# Patient Record
Sex: MALE | Race: WHITE | Employment: FULL TIME | URBAN - METROPOLITAN AREA
[De-identification: names, ages, dates, MRNs, and addresses within clinical notes are randomized per-mention and may not be internally consistent; named-entity substitution may affect disease eponyms.]

---

## 2017-01-12 ENCOUNTER — TELEPHONE (OUTPATIENT)
Dept: FAMILY MEDICINE CLINIC | Age: 32
End: 2017-01-12

## 2017-01-12 DIAGNOSIS — F41.9 ANXIETY: Primary | ICD-10-CM

## 2017-01-13 RX ORDER — LORAZEPAM 0.5 MG/1
0.5 TABLET ORAL EVERY 8 HOURS PRN
Qty: 15 TABLET | Refills: 0 | Status: SHIPPED | OUTPATIENT
Start: 2017-01-13 | End: 2018-02-23 | Stop reason: SDUPTHER

## 2017-08-23 ENCOUNTER — TELEPHONE (OUTPATIENT)
Dept: FAMILY MEDICINE CLINIC | Age: 32
End: 2017-08-23

## 2018-02-23 ENCOUNTER — OFFICE VISIT (OUTPATIENT)
Dept: FAMILY MEDICINE CLINIC | Age: 33
End: 2018-02-23

## 2018-02-23 VITALS
SYSTOLIC BLOOD PRESSURE: 132 MMHG | HEART RATE: 64 BPM | HEIGHT: 70 IN | WEIGHT: 180 LBS | DIASTOLIC BLOOD PRESSURE: 74 MMHG | RESPIRATION RATE: 12 BRPM | BODY MASS INDEX: 25.77 KG/M2

## 2018-02-23 DIAGNOSIS — Z00.00 WELL ADULT EXAM: Primary | ICD-10-CM

## 2018-02-23 DIAGNOSIS — F41.9 ANXIETY: ICD-10-CM

## 2018-02-23 PROCEDURE — 99395 PREV VISIT EST AGE 18-39: CPT | Performed by: FAMILY MEDICINE

## 2018-02-23 RX ORDER — LORAZEPAM 0.5 MG/1
0.5 TABLET ORAL EVERY 8 HOURS PRN
Qty: 15 TABLET | Refills: 1 | Status: SHIPPED | OUTPATIENT
Start: 2018-02-23 | End: 2018-03-25

## 2018-02-23 ASSESSMENT — ENCOUNTER SYMPTOMS
BACK PAIN: 0
COUGH: 0
BLOOD IN STOOL: 0
EYE PAIN: 0
CONSTIPATION: 0
NAUSEA: 0
TROUBLE SWALLOWING: 0
ABDOMINAL PAIN: 0
CHEST TIGHTNESS: 0
SHORTNESS OF BREATH: 0
SORE THROAT: 0

## 2018-02-23 ASSESSMENT — PATIENT HEALTH QUESTIONNAIRE - PHQ9
2. FEELING DOWN, DEPRESSED OR HOPELESS: 0
1. LITTLE INTEREST OR PLEASURE IN DOING THINGS: 0
SUM OF ALL RESPONSES TO PHQ QUESTIONS 1-9: 0
SUM OF ALL RESPONSES TO PHQ9 QUESTIONS 1 & 2: 0

## 2018-02-24 LAB
CHOLESTEROL, TOTAL: ABNORMAL MG/DL
CHOLESTEROL/HDL RATIO: ABNORMAL
HDLC SERPL-MCNC: ABNORMAL MG/DL (ref 35–70)
LDL CHOLESTEROL CALCULATED: 162 MG/DL (ref 0–160)
TRIGL SERPL-MCNC: ABNORMAL MG/DL
VLDLC SERPL CALC-MCNC: ABNORMAL MG/DL

## 2018-02-26 DIAGNOSIS — Z00.00 WELL ADULT EXAM: ICD-10-CM

## 2018-02-27 ENCOUNTER — TELEPHONE (OUTPATIENT)
Dept: FAMILY MEDICINE CLINIC | Age: 33
End: 2018-02-27

## 2018-02-27 DIAGNOSIS — E78.00 PURE HYPERCHOLESTEROLEMIA: Primary | ICD-10-CM

## 2018-02-27 NOTE — TELEPHONE ENCOUNTER
----- Message from Jaqueline Scott MD sent at 2/27/2018  5:59 AM EST -----  Chol up at 226 and ldl high at 162 and want less than 130 . Diet and  Exercise and slip  For  Low  Chol foods .  Level in 6 mths  On printer  For  August

## 2019-11-01 ENCOUNTER — OFFICE VISIT (OUTPATIENT)
Dept: FAMILY MEDICINE CLINIC | Age: 34
End: 2019-11-01

## 2019-11-01 VITALS
DIASTOLIC BLOOD PRESSURE: 66 MMHG | RESPIRATION RATE: 14 BRPM | BODY MASS INDEX: 25.62 KG/M2 | SYSTOLIC BLOOD PRESSURE: 120 MMHG | HEIGHT: 70 IN | WEIGHT: 179 LBS | HEART RATE: 64 BPM

## 2019-11-01 DIAGNOSIS — Z00.00 WELL ADULT EXAM: Primary | ICD-10-CM

## 2019-11-01 DIAGNOSIS — E78.5 HYPERLIPIDEMIA LDL GOAL <100: ICD-10-CM

## 2019-11-01 PROCEDURE — 99395 PREV VISIT EST AGE 18-39: CPT | Performed by: FAMILY MEDICINE

## 2019-11-01 ASSESSMENT — PATIENT HEALTH QUESTIONNAIRE - PHQ9
SUM OF ALL RESPONSES TO PHQ QUESTIONS 1-9: 0
2. FEELING DOWN, DEPRESSED OR HOPELESS: 0
1. LITTLE INTEREST OR PLEASURE IN DOING THINGS: 0
SUM OF ALL RESPONSES TO PHQ QUESTIONS 1-9: 0
SUM OF ALL RESPONSES TO PHQ9 QUESTIONS 1 & 2: 0

## 2019-11-01 ASSESSMENT — ENCOUNTER SYMPTOMS
NAUSEA: 0
ABDOMINAL PAIN: 0
COUGH: 0
BACK PAIN: 0
CONSTIPATION: 0
EYE PAIN: 0
TROUBLE SWALLOWING: 0
SHORTNESS OF BREATH: 0
BLOOD IN STOOL: 0
SORE THROAT: 0
CHEST TIGHTNESS: 0

## 2019-11-08 LAB
CHOLESTEROL, TOTAL: ABNORMAL
CHOLESTEROL/HDL RATIO: ABNORMAL
HDLC SERPL-MCNC: ABNORMAL MG/DL
LDL CHOLESTEROL CALCULATED: 191 MG/DL (ref 0–160)
TRIGL SERPL-MCNC: ABNORMAL MG/DL
VLDLC SERPL CALC-MCNC: ABNORMAL MG/DL

## 2019-11-11 DIAGNOSIS — E78.5 HYPERLIPIDEMIA LDL GOAL <100: ICD-10-CM

## 2019-11-13 ENCOUNTER — TELEPHONE (OUTPATIENT)
Dept: FAMILY MEDICINE CLINIC | Age: 34
End: 2019-11-13

## 2019-11-13 DIAGNOSIS — E78.5 HYPERLIPIDEMIA LDL GOAL <100: Primary | ICD-10-CM

## 2019-11-13 PROBLEM — E78.01 FAMILIAL HYPERCHOLESTEROLEMIA: Status: ACTIVE | Noted: 2019-11-13

## 2020-10-27 NOTE — PROGRESS NOTES
.  CARE VISIT    Madison Cespedes  YOB: 1985    Date of Service:  2/23/2018    Chief Complaint:   Madison Cespedes is a 28 y.o. male who presents for Comprehensive Annual Evaluation    There are no active problems to display for this patient. Left knee  Fair   And  left shoulder     Job  With   stress     Cystic  Fibrosis   Mild and  Vas deferens  Absent   Preventive Care:  Last eye exam:   stable   Exercise: no regular exercise  Fracture within the past 6 months: no       Living will:  no,   Need will . Review of Systems   Constitutional: Negative for fatigue and fever. HENT: Negative for congestion, ear pain, postnasal drip, sore throat and trouble swallowing. Eyes: Negative for pain. Respiratory: Negative for cough, chest tightness and shortness of breath. Cardiovascular: Negative for chest pain, palpitations and leg swelling. Vague  Pain  At  Rest    Gastrointestinal: Negative for abdominal pain, blood in stool, constipation and nausea. Genitourinary: Negative for difficulty urinating, frequency and urgency. Musculoskeletal: Negative for arthralgias, back pain, joint swelling and neck stiffness. Skin: Negative for rash. Neurological: Negative for dizziness, weakness and headaches. Hematological: Negative for adenopathy. Does not bruise/bleed easily. Psychiatric/Behavioral: Negative for behavioral problems, dysphoric mood and sleep disturbance.        No Known Allergies  Prior to Visit Medications    Not on File       Past Medical History:   Diagnosis Date    Cystic fibrosis ([address]     mild  no vas deferens      Past Surgical History:   Procedure Laterality Date    KNEE SURGERY  2010, 2011    ACL repair, and scope the following year  left     SHOULDER SURGERY Left 10-15-15    Labral Tear- OIO by Dr Santos Patient      Family History   Problem Relation Age of Onset    Prostate Cancer Father     High Cholesterol Mother      Social History     Social History    Health Maintenance Due   Topic Date Due   • DTaP/Tdap/Td Vaccine (1 - Tdap) 06/11/1959   • Shingles Vaccine (1 of 2) 06/11/1990   • Medicare Wellness Visit  10/29/2020       Patient is due for topics as listed above but is not proceeding with Immunization(s) Dtap/Tdap/Td and Shingles at this time.          Marital status:      Spouse name: N/A    Number of children: N/A    Years of education: N/A     Occupational History    Not on file. Social History Main Topics    Smoking status: Never Smoker    Smokeless tobacco: Never Used    Alcohol use 2.5 oz/week     5 drink(s) per week    Drug use: No    Sexual activity: Yes     Partners: Female     Other Topics Concern    Not on file     Social History Narrative    No narrative on file       Wt Readings from Last 3 Encounters:   02/23/18 180 lb (81.6 kg)   08/30/16 179 lb 12.8 oz (81.6 kg)   01/19/16 182 lb (82.6 kg)     BP Readings from Last 3 Encounters:   02/23/18 132/74   08/30/16 110/68   01/19/16 130/70        Vitals:    02/23/18 1053   BP: 132/74   Site: Right Arm   Position: Sitting   Cuff Size: Medium Adult   Pulse: 64   Resp: 12   Weight: 180 lb (81.6 kg)   Height: 5' 10\" (1.778 m)     Body mass index is 25.83 kg/m². Physical Exam   Constitutional: He is oriented to person, place, and time. He appears well-developed and well-nourished. HENT:   Head: Normocephalic and atraumatic. Right Ear: External ear normal.   Left Ear: External ear normal.   Nose: Nose normal.   Mouth/Throat: Oropharynx is clear and moist.   Eyes: Conjunctivae and EOM are normal. Pupils are equal, round, and reactive to light. Fundi nl   Neck: Normal range of motion. Neck supple. No thyromegaly present. Cardiovascular: Normal rate, regular rhythm, normal heart sounds and intact distal pulses. Pulmonary/Chest: Effort normal and breath sounds normal. He has no wheezes. He has no rales. Abdominal: Soft. Bowel sounds are normal. He exhibits no mass. There is no tenderness. Musculoskeletal: Normal range of motion. Lymphadenopathy:     He has no cervical adenopathy. Neurological: He is alert and oriented to person, place, and time. He has normal reflexes. No cranial nerve deficit. Skin: Skin is warm and dry. No rash noted.    Psychiatric: He has a normal mood and affect. Nursing note and vitals reviewed. Lipid panel: No results found for: CHOL, TRIG, HDL, LDLCALC, LDLDIRECT  Glucose: No results found for: GLUCOSE, GLUF    Patient Care Team:  Mariely Harris MD as PCP - General (Family Medicine)    Immunization History   Administered Date(s) Administered    Influenza Vaccine, unspecified formulation 12/07/2016    Influenza Virus Vaccine 10/30/2014, 11/11/2015, 12/07/2016    Tdap (Boostrix, Adacel) 12/07/2016       Health Maintenance Due   Topic Date Due    HIV screen  10/10/2000    Flu vaccine (1) 09/01/2017        Assessment/Plan:  1. Well adult exam         PLAN       Current Outpatient Prescriptions   Medication Sig Dispense Refill    LORazepam (ATIVAN) 0.5 MG tablet Take 1 tablet by mouth every 8 hours as needed for Anxiety for up to 30 days. 15 tablet 1     No current facility-administered medications for this visit. No current outpatient prescriptions on file. No current facility-administered medications for this visit. No Follow-up on file.     anxiety    With  Flying  And  Ativan prn

## 2020-12-23 ENCOUNTER — OFFICE VISIT (OUTPATIENT)
Dept: FAMILY MEDICINE CLINIC | Age: 35
End: 2020-12-23

## 2020-12-23 VITALS
DIASTOLIC BLOOD PRESSURE: 74 MMHG | BODY MASS INDEX: 24.79 KG/M2 | SYSTOLIC BLOOD PRESSURE: 128 MMHG | WEIGHT: 173.13 LBS | TEMPERATURE: 96.8 F | RESPIRATION RATE: 14 BRPM | HEART RATE: 64 BPM | HEIGHT: 70 IN

## 2020-12-23 PROCEDURE — 99395 PREV VISIT EST AGE 18-39: CPT | Performed by: FAMILY MEDICINE

## 2020-12-23 SDOH — ECONOMIC STABILITY: TRANSPORTATION INSECURITY
IN THE PAST 12 MONTHS, HAS THE LACK OF TRANSPORTATION KEPT YOU FROM MEDICAL APPOINTMENTS OR FROM GETTING MEDICATIONS?: NO

## 2020-12-23 SDOH — ECONOMIC STABILITY: FOOD INSECURITY: WITHIN THE PAST 12 MONTHS, THE FOOD YOU BOUGHT JUST DIDN'T LAST AND YOU DIDN'T HAVE MONEY TO GET MORE.: NEVER TRUE

## 2020-12-23 SDOH — ECONOMIC STABILITY: INCOME INSECURITY: HOW HARD IS IT FOR YOU TO PAY FOR THE VERY BASICS LIKE FOOD, HOUSING, MEDICAL CARE, AND HEATING?: NOT HARD AT ALL

## 2020-12-23 SDOH — ECONOMIC STABILITY: TRANSPORTATION INSECURITY
IN THE PAST 12 MONTHS, HAS LACK OF TRANSPORTATION KEPT YOU FROM MEETINGS, WORK, OR FROM GETTING THINGS NEEDED FOR DAILY LIVING?: NO

## 2020-12-23 SDOH — ECONOMIC STABILITY: FOOD INSECURITY: WITHIN THE PAST 12 MONTHS, YOU WORRIED THAT YOUR FOOD WOULD RUN OUT BEFORE YOU GOT MONEY TO BUY MORE.: NEVER TRUE

## 2020-12-23 ASSESSMENT — ENCOUNTER SYMPTOMS
NAUSEA: 0
SORE THROAT: 0
TROUBLE SWALLOWING: 0
BACK PAIN: 0
SHORTNESS OF BREATH: 0
CONSTIPATION: 0
EYE PAIN: 0
CHEST TIGHTNESS: 0
ABDOMINAL PAIN: 0
BLOOD IN STOOL: 0
COUGH: 0

## 2020-12-23 ASSESSMENT — PATIENT HEALTH QUESTIONNAIRE - PHQ9
SUM OF ALL RESPONSES TO PHQ QUESTIONS 1-9: 0
SUM OF ALL RESPONSES TO PHQ QUESTIONS 1-9: 0
2. FEELING DOWN, DEPRESSED OR HOPELESS: 0
1. LITTLE INTEREST OR PLEASURE IN DOING THINGS: 0
SUM OF ALL RESPONSES TO PHQ9 QUESTIONS 1 & 2: 0
SUM OF ALL RESPONSES TO PHQ QUESTIONS 1-9: 0

## 2020-12-23 NOTE — PROGRESS NOTES
CARE VISIT    Maribell Mcbride  YOB: 1985    Date of Service:  12/23/2020    Chief Complaint:   Maribell Mcbride is a 28 y.o. male who presents for Comprehensive Annual Evaluation    Patient Active Problem List    Diagnosis Date Noted    Familial hypercholesterolemia 11/13/2019     Lipids  Was  Up  Noted       Preventive Care:  Last eye exam  Last   Few  Years  Exercise: exercise and better   Fracture within the past 6 months: no      Living will:    Needs  Will  And  Trust     Review of Systems   Constitutional: Negative for fatigue and fever. HENT: Negative for congestion, ear pain, postnasal drip, sore throat and trouble swallowing. Eyes: Negative for pain. Respiratory: Negative for cough, chest tightness and shortness of breath. Cardiovascular: Negative for chest pain, palpitations and leg swelling. Gastrointestinal: Negative for abdominal pain, blood in stool, constipation and nausea. Genitourinary: Negative for difficulty urinating, frequency and urgency. Musculoskeletal: Negative for arthralgias, back pain, joint swelling and neck stiffness. Skin: Negative for rash. Neurological: Negative for dizziness, weakness and headaches. Hematological: Negative for adenopathy. Does not bruise/bleed easily. Psychiatric/Behavioral: Negative for behavioral problems, dysphoric mood and sleep disturbance.        No Known Allergies  Prior to Visit Medications    Not on File       Past Medical History:   Diagnosis Date    Cystic fibrosis ([facility] [address]     mild  no vas deferens      Past Surgical History:   Procedure Laterality Date    KNEE SURGERY  2010, 2011    ACL repair, and scope the following year  left     SHOULDER SURGERY Left 10-15-15    Labral Tear- OIO by Dr Rena Contreras      Family History   Problem Relation Age of Onset    Prostate Cancer Father     High Cholesterol Mother      Social History     Socioeconomic History    Marital status:      Spouse name: Not on file  Number of children: Not on file    Years of education: Not on file    Highest education level: Not on file   Occupational History    Not on file   Social Needs    Financial resource strain: Not hard at all    Food insecurity     Worry: Never true     Inability: Never true   [employer] needs     Medical: No     Non-medical: No   Tobacco Use    Smoking status: Never Smoker    Smokeless tobacco: Never Used   Substance and Sexual Activity    Alcohol use: Yes     Alcohol/week: 4.2 standard drinks     Types: 5 drink(s) per week    Drug use: No    Sexual activity: Yes     Partners: Female   Lifestyle    Physical activity     Days per week: Not on file     Minutes per session: Not on file    Stress: Not on file   Relationships    Social connections     Talks on phone: Not on file     Gets together: Not on file     Attends Judaism service: Not on file     Active member of club or organization: Not on file     Attends meetings of clubs or organizations: Not on file     Relationship status: Not on file    Intimate partner violence     Fear of current or ex partner: Not on file     Emotionally abused: Not on file     Physically abused: Not on file     Forced sexual activity: Not on file   Other Topics Concern    Not on file   Social History Narrative    Not on file       Wt Readings from Last 3 Encounters:   12/23/20 173 lb 2 oz (78.5 kg)   11/01/19 179 lb (81.2 kg)   02/23/18 180 lb (81.6 kg)     BP Readings from Last 3 Encounters:   12/23/20 128/74   11/01/19 120/66   02/23/18 132/74        Vitals:    12/23/20 0823   BP: 128/74   Site: Right Upper Arm   Position: Sitting   Cuff Size: Medium Adult   Pulse: 64   Resp: 14   Temp: 96.8 °F (36 °C)   TempSrc: Oral   Weight: 173 lb 2 oz (78.5 kg)   Height: 5' 10\" (1.778 m)     Body mass index is 24.84 kg/m². Physical Exam  Vitals signs and nursing note reviewed. Constitutional:       Appearance: He is well-developed.    HENT: Head: Normocephalic and atraumatic. Right Ear: External ear normal.      Left Ear: External ear normal.      Nose: Nose normal.   Eyes:      Conjunctiva/sclera: Conjunctivae normal.      Pupils: Pupils are equal, round, and reactive to light. Comments: Fundi nl   Neck:      Musculoskeletal: Normal range of motion and neck supple. Thyroid: No thyromegaly. Cardiovascular:      Rate and Rhythm: Normal rate and regular rhythm. Heart sounds: Normal heart sounds. Pulmonary:      Effort: Pulmonary effort is normal.      Breath sounds: Normal breath sounds. No wheezing or rales. Abdominal:      General: Bowel sounds are normal.      Palpations: Abdomen is soft. There is no mass. Tenderness: There is no abdominal tenderness. Musculoskeletal: Normal range of motion. Lymphadenopathy:      Cervical: No cervical adenopathy. Skin:     General: Skin is warm and dry. Findings: No rash. Neurological:      Mental Status: He is alert and oriented to person, place, and time. Cranial Nerves: No cranial nerve deficit. Deep Tendon Reflexes: Reflexes are normal and symmetric.          Lipid panel:   Lab Results   Component Value Date    LDLCALC 191 (A) 11/08/2019     Glucose: No results found for: GLUCOSE, GLUF    Patient Care Team:  Yelena Jain MD as PCP - General (Family Medicine)  Yelena Jain MD as PCP - [facility]    Immunization History   Administered Date(s) Administered    DTP 1985, 01/24/1986, 04/04/1986, 07/08/1987, 08/16/1991    Hepatitis B Ped/Adol (Engerix-B, Recombivax HB) 06/23/1999, 08/12/1999, 11/07/2004    Hib vaccine 02/26/1988    Influenza Vaccine, unspecified formulation 12/07/2016    Influenza Virus Vaccine 10/30/2014, 11/11/2015, 12/07/2016, 10/22/2018, 10/07/2019    Influenza, MDCK Quadv, IM, PF (Flucelvax 4 yrs and older) 10/17/2018, 10/08/2019, 10/05/2020    MMR 12/12/1987, 02/27/1998  Polio OPV 1985, 01/24/1986, 04/04/1986, 07/08/1987, 08/16/1991    Tdap (Boostrix, Adacel) 12/07/2016    Varicella (Varivax) 06/23/1999, 08/12/1999       Health Maintenance Due   Topic Date Due    Hepatitis C screen  1985    HIV screen  10/10/2000        Assessment/Plan:     Diagnosis Orders   1. Well adult exam  Lipid Panel    Glucose, Fasting   2. Familial hypercholesterolemia  Lipid Panel     There are no diagnoses linked to this encounter. PLAN      Orders Placed This Encounter   Procedures    Lipid Panel     Standing Status:   Future     Standing Expiration Date:   12/23/2021     Order Specific Question:   Is Patient Fasting?/# of Hours     Answer:   YES 12 HOURS    Glucose, Fasting     Standing Status:   Future     Standing Expiration Date:   12/23/2021     No current outpatient medications on file. No current facility-administered medications for this visit. No follow-ups on file.        Exercise   Needs  Will and  Trust and [address]

## 2021-01-08 LAB
CHOLESTEROL, TOTAL: NORMAL
CHOLESTEROL/HDL RATIO: NORMAL
HDLC SERPL-MCNC: NORMAL MG/DL
LDL CHOLESTEROL CALCULATED: 145 MG/DL (ref 0–160)
NONHDLC SERPL-MCNC: NORMAL MG/DL
TRIGL SERPL-MCNC: NORMAL MG/DL
VLDLC SERPL CALC-MCNC: NORMAL MG/DL

## 2021-01-11 DIAGNOSIS — Z00.00 WELL ADULT EXAM: ICD-10-CM

## 2021-01-11 DIAGNOSIS — E78.01 FAMILIAL HYPERCHOLESTEROLEMIA: ICD-10-CM

## 2021-01-12 ENCOUNTER — TELEPHONE (OUTPATIENT)
Dept: FAMILY MEDICINE CLINIC | Age: 36
End: 2021-01-12

## 2021-01-12 NOTE — TELEPHONE ENCOUNTER
----- Message from Dave Vasquez MD sent at 1/12/2021  7:34 AM EST -----  Call as  sugar  Good and  Much  Better and  [address] 145  As  Was 191 and 162 so overall better

## 2021-08-11 ENCOUNTER — OFFICE VISIT (OUTPATIENT)
Dept: FAMILY MEDICINE CLINIC | Age: 36
End: 2021-08-11

## 2021-08-11 VITALS
TEMPERATURE: 96.8 F | RESPIRATION RATE: 14 BRPM | HEART RATE: 64 BPM | BODY MASS INDEX: 25.13 KG/M2 | DIASTOLIC BLOOD PRESSURE: 76 MMHG | HEIGHT: 70 IN | SYSTOLIC BLOOD PRESSURE: 126 MMHG | WEIGHT: 175.5 LBS

## 2021-08-11 DIAGNOSIS — Z80.42 FAMILY HISTORY OF PROSTATE CANCER IN FATHER: ICD-10-CM

## 2021-08-11 DIAGNOSIS — R55 VASOVAGAL SYNCOPE: Primary | ICD-10-CM

## 2021-08-11 DIAGNOSIS — R35.0 URINARY FREQUENCY: ICD-10-CM

## 2021-08-11 DIAGNOSIS — E78.01 FAMILIAL HYPERCHOLESTEROLEMIA: ICD-10-CM

## 2021-08-11 LAB
BACTERIA URINE, POC: NORMAL
BILIRUBIN URINE: 0 MG/DL
BLOOD, URINE: NEGATIVE
CASTS URINE, POC: NORMAL
CLARITY: CLEAR
COLOR: YELLOW
CRYSTALS URINE, POC: NORMAL
EPI CELLS URINE, POC: NORMAL
GLUCOSE URINE: NEGATIVE
KETONES, URINE: NEGATIVE
LEUKOCYTE EST, POC: NEGATIVE
NITRITE, URINE: NEGATIVE
PH UA: 6.5 (ref 4.5–8)
PROTEIN UA: NEGATIVE
RBC URINE, POC: NORMAL
SPECIFIC GRAVITY UA: 1.01 (ref 1–1.03)
UROBILINOGEN, URINE: NORMAL
WBC URINE, POC: NORMAL
YEAST URINE, POC: NORMAL

## 2021-08-11 PROCEDURE — 81000 URINALYSIS NONAUTO W/SCOPE: CPT | Performed by: FAMILY MEDICINE

## 2021-08-11 PROCEDURE — 93000 ELECTROCARDIOGRAM COMPLETE: CPT | Performed by: FAMILY MEDICINE

## 2021-08-11 PROCEDURE — 99214 OFFICE O/P EST MOD 30 MIN: CPT | Performed by: FAMILY MEDICINE

## 2021-08-11 ASSESSMENT — PATIENT HEALTH QUESTIONNAIRE - PHQ9
SUM OF ALL RESPONSES TO PHQ9 QUESTIONS 1 & 2: 0
SUM OF ALL RESPONSES TO PHQ QUESTIONS 1-9: 0
SUM OF ALL RESPONSES TO PHQ QUESTIONS 1-9: 0
1. LITTLE INTEREST OR PLEASURE IN DOING THINGS: 0
SUM OF ALL RESPONSES TO PHQ QUESTIONS 1-9: 0
2. FEELING DOWN, DEPRESSED OR HOPELESS: 0

## 2021-08-11 ASSESSMENT — ENCOUNTER SYMPTOMS
BACK PAIN: 0
ABDOMINAL PAIN: 0
BLOOD IN STOOL: 0
SHORTNESS OF BREATH: 0
EYE PAIN: 0
TROUBLE SWALLOWING: 0
SORE THROAT: 0
CHEST TIGHTNESS: 0
COUGH: 0
NAUSEA: 0
CONSTIPATION: 0

## 2021-08-11 NOTE — PROGRESS NOTES
Financial Resource Strain: Low Risk     Difficulty of Paying Living Expenses: Not hard at all   Food Insecurity: No Food Insecurity    Worried About Running Out of Food in the Last Year: Never true    Jake of Food in the Last Year: Never true   Transportation Needs: No Transportation Needs    Lack of Transportation (Medical): No    Lack of Transportation (Non-Medical): No   Physical Activity:     Days of Exercise per Week:     Minutes of Exercise per Session:    Stress:     Feeling of Stress :    Social Connections:     Frequency of Communication with Friends and Family:     Frequency of Social Gatherings with Friends and Family:     Attends Quaker Services:     Active Member of Clubs or Organizations:     Attends Club or Organization Meetings:     Marital Status:    Intimate Partner Violence:     Fear of Current or Ex-Partner:     Emotionally Abused:     Physically Abused:     Sexually Abused:      Family History   Problem Relation Age of Onset    Prostate Cancer Father 48    High Cholesterol Mother     Heart Disease Paternal Grandfather 71     Review of Systems   Constitutional: Negative for fatigue and fever. HENT: Negative for congestion, ear pain, postnasal drip, sore throat and trouble swallowing. Eyes: Negative for pain. Respiratory: Negative for cough, chest tightness and shortness of breath. Cardiovascular: Positive for syncope. Negative for chest pain, palpitations and leg swelling. Gastrointestinal: Negative for abdominal pain, blood in stool, constipation and nausea. Genitourinary: Positive for flank pain. Negative for difficulty urinating, frequency and urgency. Musculoskeletal: Negative for arthralgias, back pain, joint swelling and neck stiffness. Skin: Negative for rash. Neurological: Negative for dizziness, weakness and headaches. Hematological: Negative for adenopathy. Does not bruise/bleed easily.    Psychiatric/Behavioral: Negative for behavioral problems, dysphoric mood and sleep disturbance. /76 (Site: Right Upper Arm, Position: Sitting, Cuff Size: Medium Adult)   Pulse 64   Temp 96.8 °F (36 °C) (Infrared)   Resp 14   Ht 5' 10\" (1.778 m)   Wt 175 lb 8 oz (79.6 kg)   BMI 25.18 kg/m²   Objective:   Physical Exam  Vitals and nursing note reviewed. Constitutional:       Appearance: He is well-developed. HENT:      Head: Normocephalic and atraumatic. Right Ear: External ear normal.      Left Ear: External ear normal.      Nose: Nose normal.   Eyes:      Conjunctiva/sclera: Conjunctivae normal.      Pupils: Pupils are equal, round, and reactive to light. Comments: Fundi nl   Neck:      Thyroid: No thyromegaly. Cardiovascular:      Rate and Rhythm: Normal rate and regular rhythm. Heart sounds: Normal heart sounds. Pulmonary:      Effort: Pulmonary effort is normal.      Breath sounds: Normal breath sounds. No wheezing or rales. Abdominal:      General: Bowel sounds are normal.      Palpations: Abdomen is soft. There is no mass. Tenderness: There is no abdominal tenderness. Musculoskeletal:         General: Normal range of motion. Cervical back: Normal range of motion and neck supple. Comments: Mid  Back pain  Noted    With  Muscle  Spasm  At L2  Area    Lymphadenopathy:      Cervical: No cervical adenopathy. Skin:     General: Skin is warm and dry. Findings: No rash. Neurological:      Mental Status: He is alert and oriented to person, place, and time. Cranial Nerves: No cranial nerve deficit. Deep Tendon Reflexes: Reflexes are normal and symmetric.       ekg  nsr  And  With no  t  Wave and  No  St  Changes     Assessment:       Diagnosis Orders   1. Vasovagal syncope  EKG 12 Lead    TSH with Reflex    Comprehensive Metabolic Panel    CBC Auto Differential   2. Urinary frequency  POC URINE with Microscopic   3.  Familial hypercholesterolemia  Lipid Panel         Plan:      No

## 2021-08-13 ENCOUNTER — NURSE ONLY (OUTPATIENT)
Dept: LAB | Age: 36
End: 2021-08-13

## 2021-08-13 DIAGNOSIS — E78.01 FAMILIAL HYPERCHOLESTEROLEMIA: ICD-10-CM

## 2021-08-13 DIAGNOSIS — R35.0 URINARY FREQUENCY: ICD-10-CM

## 2021-08-13 DIAGNOSIS — Z80.42 FAMILY HISTORY OF PROSTATE CANCER IN FATHER: ICD-10-CM

## 2021-08-13 DIAGNOSIS — R55 VASOVAGAL SYNCOPE: ICD-10-CM

## 2021-08-13 LAB
ALBUMIN SERPL-MCNC: 4.8 G/DL (ref 3.5–5.1)
ALP BLD-CCNC: 89 U/L (ref 38–126)
ALT SERPL-CCNC: 19 U/L (ref 11–66)
ANION GAP SERPL CALCULATED.3IONS-SCNC: 11 MEQ/L (ref 8–16)
AST SERPL-CCNC: 20 U/L (ref 5–40)
BASOPHILS # BLD: 1.7 %
BASOPHILS ABSOLUTE: 0.1 THOU/MM3 (ref 0–0.1)
BILIRUB SERPL-MCNC: 1 MG/DL (ref 0.3–1.2)
BUN BLDV-MCNC: 23 MG/DL (ref 7–22)
CALCIUM SERPL-MCNC: 9.7 MG/DL (ref 8.5–10.5)
CHLORIDE BLD-SCNC: 102 MEQ/L (ref 98–111)
CHOLESTEROL, TOTAL: 211 MG/DL (ref 100–199)
CO2: 26 MEQ/L (ref 23–33)
CREAT SERPL-MCNC: 1.1 MG/DL (ref 0.4–1.2)
EOSINOPHIL # BLD: 2.1 %
EOSINOPHILS ABSOLUTE: 0.2 THOU/MM3 (ref 0–0.4)
ERYTHROCYTE [DISTWIDTH] IN BLOOD BY AUTOMATED COUNT: 11.8 % (ref 11.5–14.5)
ERYTHROCYTE [DISTWIDTH] IN BLOOD BY AUTOMATED COUNT: 40.7 FL (ref 35–45)
GFR SERPL CREATININE-BSD FRML MDRD: 76 ML/MIN/1.73M2
GLUCOSE BLD-MCNC: 89 MG/DL (ref 70–108)
HCT VFR BLD CALC: 46.9 % (ref 42–52)
HDLC SERPL-MCNC: 55 MG/DL
HEMOGLOBIN: 15.5 GM/DL (ref 14–18)
IMMATURE GRANS (ABS): 0.01 THOU/MM3 (ref 0–0.07)
IMMATURE GRANULOCYTES: 0.1 %
LDL CHOLESTEROL CALCULATED: 143 MG/DL
LYMPHOCYTES # BLD: 28.5 %
LYMPHOCYTES ABSOLUTE: 2.1 THOU/MM3 (ref 1–4.8)
MCH RBC QN AUTO: 30.9 PG (ref 26–33)
MCHC RBC AUTO-ENTMCNC: 33 GM/DL (ref 32.2–35.5)
MCV RBC AUTO: 93.4 FL (ref 80–94)
MONOCYTES # BLD: 9 %
MONOCYTES ABSOLUTE: 0.7 THOU/MM3 (ref 0.4–1.3)
NUCLEATED RED BLOOD CELLS: 0 /100 WBC
PLATELET # BLD: 254 THOU/MM3 (ref 130–400)
PMV BLD AUTO: 10.4 FL (ref 9.4–12.4)
POTASSIUM SERPL-SCNC: 5.1 MEQ/L (ref 3.5–5.2)
PROSTATE SPECIFIC ANTIGEN: 0.89 NG/ML (ref 0–1)
RBC # BLD: 5.02 MILL/MM3 (ref 4.7–6.1)
SEG NEUTROPHILS: 58.6 %
SEGMENTED NEUTROPHILS ABSOLUTE COUNT: 4.3 THOU/MM3 (ref 1.8–7.7)
SODIUM BLD-SCNC: 139 MEQ/L (ref 135–145)
TOTAL PROTEIN: 7.7 G/DL (ref 6.1–8)
TRIGL SERPL-MCNC: 64 MG/DL (ref 0–199)
TSH SERPL DL<=0.05 MIU/L-ACNC: 1.47 UIU/ML (ref 0.4–4.2)
WBC # BLD: 7.3 THOU/MM3 (ref 4.8–10.8)

## 2021-08-16 ENCOUNTER — TELEPHONE (OUTPATIENT)
Dept: FAMILY MEDICINE CLINIC | Age: 36
End: 2021-08-16

## 2021-08-16 NOTE — TELEPHONE ENCOUNTER
----- Message from Sultana Oropeza MD sent at 8/16/2021  5:01 AM EDT -----  Call as bun is slight up and needs more water and fluids as show kidney function slight low but really just need fluids    Chol 211 but bad chol 143 so chol needs to have ldl around 120 so diet and exercise. Low triglycerides so that is good    Please call . Mark Perry  ok to repeat labs in one year

## 2022-01-10 ENCOUNTER — VIRTUAL VISIT (OUTPATIENT)
Dept: FAMILY MEDICINE CLINIC | Age: 37
End: 2022-01-10

## 2022-01-10 DIAGNOSIS — J22 LOWER RESPIRATORY TRACT INFECTION DUE TO COVID-19 VIRUS: Primary | ICD-10-CM

## 2022-01-10 DIAGNOSIS — U07.1 LOWER RESPIRATORY TRACT INFECTION DUE TO COVID-19 VIRUS: Primary | ICD-10-CM

## 2022-01-10 PROCEDURE — 99213 OFFICE O/P EST LOW 20 MIN: CPT | Performed by: FAMILY MEDICINE

## 2022-01-10 RX ORDER — AZITHROMYCIN 250 MG/1
250 TABLET, FILM COATED ORAL SEE ADMIN INSTRUCTIONS
Qty: 6 TABLET | Refills: 0 | Status: SHIPPED | OUTPATIENT
Start: 2022-01-10 | End: 2022-01-15

## 2022-01-10 RX ORDER — BENZONATATE 200 MG/1
200 CAPSULE ORAL 3 TIMES DAILY PRN
Qty: 30 CAPSULE | Refills: 0 | Status: SHIPPED | OUTPATIENT
Start: 2022-01-10 | End: 2022-01-17

## 2022-01-10 RX ORDER — DEXAMETHASONE 6 MG/1
6 TABLET ORAL 2 TIMES DAILY WITH MEALS
Qty: 7 TABLET | Refills: 0 | Status: SHIPPED | OUTPATIENT
Start: 2022-01-10 | End: 2022-01-24

## 2022-01-10 SDOH — ECONOMIC STABILITY: FOOD INSECURITY: WITHIN THE PAST 12 MONTHS, YOU WORRIED THAT YOUR FOOD WOULD RUN OUT BEFORE YOU GOT MONEY TO BUY MORE.: NEVER TRUE

## 2022-01-10 SDOH — ECONOMIC STABILITY: FOOD INSECURITY: WITHIN THE PAST 12 MONTHS, THE FOOD YOU BOUGHT JUST DIDN'T LAST AND YOU DIDN'T HAVE MONEY TO GET MORE.: NEVER TRUE

## 2022-01-10 ASSESSMENT — ENCOUNTER SYMPTOMS
COUGH: 1
EYE PAIN: 0
NAUSEA: 0
CHEST TIGHTNESS: 0
SINUS PRESSURE: 0
TROUBLE SWALLOWING: 0
SHORTNESS OF BREATH: 0
BACK PAIN: 0
SORE THROAT: 0
ABDOMINAL PAIN: 0
BLOOD IN STOOL: 0
CONSTIPATION: 0

## 2022-01-10 ASSESSMENT — SOCIAL DETERMINANTS OF HEALTH (SDOH): HOW HARD IS IT FOR YOU TO PAY FOR THE VERY BASICS LIKE FOOD, HOUSING, MEDICAL CARE, AND HEATING?: NOT HARD AT ALL

## 2022-01-10 NOTE — PROGRESS NOTES
Arcenio Wu agreed to Video Chat/Exam in presence of Dr Reta Olivier and myself. Verified who was present in room with Arcenio Wu. Arcenio Wu informed the e-mail address used to Face Time cannot be used to contact the Provider, if they are any questions or concerns they need to call the office directly. Arcenio Wu stated understanding.

## 2022-01-10 NOTE — PROGRESS NOTES
Jan Torres (:  1985) is a 39 y.o. male,Established patient, here for evaluation of the following chief complaint(s): Positive For Covid-19         ASSESSMENT     Diagnosis Orders   1. Lower respiratory tract infection due to COVID-19 virus  azithromycin (ZITHROMAX) 250 MG tablet    dexamethasone (DECADRON) 6 MG tablet    benzonatate (TESSALON) 200 MG capsule         /PLAN:No follow-ups on file. Current Outpatient Medications   Medication Sig Dispense Refill    azithromycin (ZITHROMAX) 250 MG tablet Take 1 tablet by mouth See Admin Instructions for 5 days 500mg on day 1 followed by 250mg on days 2 - 5 6 tablet 0    dexamethasone (DECADRON) 6 MG tablet Take 1 tablet by mouth 2 times daily (with meals) for 14 days 7 tablet 0    benzonatate (TESSALON) 200 MG capsule Take 1 capsule by mouth 3 times daily as needed for Cough 30 capsule 0     No current facility-administered medications for this visit. call if  persist  But  Off  Work  1-3  To 16 and  Return  -  To  work  SUBJECTIVE/OBJECTIVE:  Cough  This is a new (  1-3 positive and  started  22) problem. The cough is non-productive. Pertinent negatives include no chest pain, ear pain, fever, headaches, postnasal drip, rash, sore throat or shortness of breath. Symptoms start 1-3-22 and now  Worse     Review of Systems   Constitutional: Positive for fatigue. Negative for fever. HENT: Negative for congestion, ear pain, postnasal drip, sinus pressure, sore throat and trouble swallowing. Loss  Of  Taste and smell   Eyes: Negative for pain. Respiratory: Positive for cough. Negative for chest tightness and shortness of breath. Cardiovascular: Negative for chest pain, palpitations and leg swelling. Gastrointestinal: Negative for abdominal pain, blood in stool, constipation and nausea. Genitourinary: Negative for difficulty urinating, frequency and urgency.    Musculoskeletal: Negative for arthralgias, back consent to proceed has been obtained within the past 12 months. The visit was conducted pursuant to the emergency declaration under the [address] [place] [address] and the [employer]iaran Weaved and [employer] Act. Patient identification was verified, and a caregiver was present when appropriate. The patient was located in a state where the provider was credentialed to provide care. An electronic signature was used to authenticate this note.     --Kirstin Bass MD

## 2025-01-17 ENCOUNTER — HOSPITAL ENCOUNTER (OUTPATIENT)
Dept: OCCUPATIONAL THERAPY | Age: 40
Setting detail: THERAPIES SERIES
Discharge: HOME | End: 2025-01-17
Payer: COMMERCIAL

## 2025-01-17 PROCEDURE — 97165 OT EVAL LOW COMPLEX 30 MIN: CPT

## 2025-01-17 NOTE — PROGRESS NOTES
HEP/Education Completed: Plan of Care, Goals, explained purpose of medigrip sleeve, suggested going to store and getting an over the counter elbow support sleeve to see if this would help with pain  Medbridge Access Code for HEP:   []  No new Education completed  []  Reviewed Prior HEP      [x]  Patient verbalized and/or demonstrated understanding of education provided.  []  Patient unable to verbalize and/or demonstrate understanding of education provided.  Will continue education.  [x]  Barriers to learning: none    PLAN:  Treatment Recommendations: Strengthening, Manual Therapy - Soft Tissue Mobilization, Home Exercise Program, and Modalities    [x]  Plan of care initiated.  Plan to see patient   2x times per week for 4 weeks to address the treatment planned outlined above.  []  Continue with current plan of care  []  Modify plan of care as follows:    []  Hold pending physician visit  []  Discharge    Time In 0700   Time Out 0750   Timed Code Minutes: 0 min   Total Treatment Time: 50 min       Electronically Signed by: Bethany Huerta, OTR/L [id number]

## 2025-01-24 ENCOUNTER — HOSPITAL ENCOUNTER (OUTPATIENT)
Dept: OCCUPATIONAL THERAPY | Age: 40
Setting detail: THERAPIES SERIES
Discharge: HOME | End: 2025-01-24
Payer: COMMERCIAL

## 2025-01-24 PROCEDURE — 97110 THERAPEUTIC EXERCISES: CPT

## 2025-01-24 PROCEDURE — 97140 MANUAL THERAPY 1/> REGIONS: CPT

## 2025-01-24 PROCEDURE — 97035 APP MDLTY 1+ULTRASOUND EA 15: CPT

## 2025-01-24 NOTE — PROGRESS NOTES
[facility]  OCCUPATIONAL THERAPY  [] EVALUATION  [x] DAILY NOTE (LAND) [] DAILY NOTE (AQUATIC ) [] PROGRESS NOTE [] DISCHARGE NOTE    [x] OUTPATIENT REHABILITATION CENTER [facility]   [] [facility] CARE [place]    [] [facility]   [] [facility]    Date: 2025  Patient Name:  Ray Panda  : 1985  MRN: [id number]  CSN: [id number]    Referring Practitioner James Tripathi MD [id number]      Diagnosis  Diagnoses       Z47.89 (ICD-10-CM) - Encounter for other orthopedic aftercare    M77.01 (ICD-10-CM) - Medial epicondylitis, right elbow           Treatment Diagnosis M25.521  Right Elbow Pain  R53.1 Weakness   Date of Evaluation 25   Additional Pertinent History Ray Panda has a past medical history of Cystic fibrosis (HCC) and Familial hypercholesterolemia.  he has a past surgical history that includes knee surgery (, ) and shoulder surgery (Left, 10-15-15).     Allergies No Known Allergies   Medications No current outpatient medications on file.      Functional Outcome Measure Used UEFI   Functional Outcome Score 51/80 (25)       Insurance: Primary: Payor: [employer] /  /  / ,   Secondary:    Authorization Information PRE CERTIFICATION REQUIRED: Additional authorization not required.  INSURANCE THERAPY BENEFIT: Visits approved based on medical necessity.. .   AQUATIC THERAPY COVERED: Yes  MODALITIES COVERED:  Yes   Initial CPT Codes Requested 97140-Manual Therapy, 97110-Therapeutic Exercise and 97035-Ultrasound   Progress Note Counter 2/10 for progress note (Reporting Period: 25 to     )   Recertification Date 25   Physician Follow-Up Sometime in February - pt. Not sure   Physician Orders OT evaluation for AROM, AAROM, PROM and strengthening   History of Present Illness Pt. Is 12 weeks post op from a right elbow flexor pronator debridement which occurred 10/21/24. Pt. Continues to feel that his right elbow is stiff and painful. Pt.

## 2025-01-28 ENCOUNTER — HOSPITAL ENCOUNTER (OUTPATIENT)
Dept: OCCUPATIONAL THERAPY | Age: 40
Setting detail: THERAPIES SERIES
Discharge: HOME | End: 2025-01-28
Payer: COMMERCIAL

## 2025-01-28 PROCEDURE — 97140 MANUAL THERAPY 1/> REGIONS: CPT

## 2025-01-28 PROCEDURE — 97110 THERAPEUTIC EXERCISES: CPT

## 2025-01-28 PROCEDURE — 97035 APP MDLTY 1+ULTRASOUND EA 15: CPT

## 2025-01-28 NOTE — PROGRESS NOTES
[facility]  OCCUPATIONAL THERAPY  [] EVALUATION  [x] DAILY NOTE (LAND) [] DAILY NOTE (AQUATIC ) [] PROGRESS NOTE [] DISCHARGE NOTE    [x] OUTPATIENT REHABILITATION CENTER [facility]   [] [facility] CARE [place]    [] [facility]   [] [facility]    Date: 2025  Patient Name:  Ray Panda  : 1985  MRN: [id number]  CSN: [id number]    Referring Practitioner James Tripathi MD [id number]      Diagnosis  Diagnoses       Z47.89 (ICD-10-CM) - Encounter for other orthopedic aftercare    M77.01 (ICD-10-CM) - Medial epicondylitis, right elbow           Treatment Diagnosis M25.521  Right Elbow Pain  R53.1 Weakness   Date of Evaluation 25   Additional Pertinent History Ray Panda has a past medical history of Cystic fibrosis (HCC) and Familial hypercholesterolemia.  he has a past surgical history that includes knee surgery (, ) and shoulder surgery (Left, 10-15-15).     Allergies No Known Allergies   Medications No current outpatient medications on file.      Functional Outcome Measure Used UEFI   Functional Outcome Score 51/80 (25)       Insurance: Primary: Payor: [employer] /  /  / ,   Secondary:    Authorization Information PRE CERTIFICATION REQUIRED: Additional authorization not required.  INSURANCE THERAPY BENEFIT: Visits approved based on medical necessity.. .   AQUATIC THERAPY COVERED: Yes  MODALITIES COVERED:  Yes   Initial CPT Codes Requested 97140-Manual Therapy, 97110-Therapeutic Exercise and 97035-Ultrasound   Progress Note Counter 3/10 for progress note (Reporting Period: 25 to     )   Recertification Date 25   Physician Follow-Up Sometime in February - pt. Not sure   Physician Orders OT evaluation for AROM, AAROM, PROM and strengthening   History of Present Illness Pt. Is 12 weeks post op from a right elbow flexor pronator debridement which occurred 10/21/24. Pt. Continues to feel that his right elbow is stiff and painful. Pt.

## 2025-01-31 ENCOUNTER — HOSPITAL ENCOUNTER (OUTPATIENT)
Dept: OCCUPATIONAL THERAPY | Age: 40
Setting detail: THERAPIES SERIES
Discharge: HOME | End: 2025-01-31
Payer: COMMERCIAL

## 2025-01-31 PROCEDURE — 97140 MANUAL THERAPY 1/> REGIONS: CPT

## 2025-01-31 PROCEDURE — 97035 APP MDLTY 1+ULTRASOUND EA 15: CPT

## 2025-01-31 PROCEDURE — 97110 THERAPEUTIC EXERCISES: CPT

## 2025-01-31 NOTE — PROGRESS NOTES
[facility]  OCCUPATIONAL THERAPY  [] EVALUATION  [x] DAILY NOTE (LAND) [] DAILY NOTE (AQUATIC ) [] PROGRESS NOTE [] DISCHARGE NOTE    [x] OUTPATIENT REHABILITATION CENTER [facility]   [] [facility] CARE [place]    [] [facility]   [] [facility]    Date: 2025  Patient Name:  Ray Panda  : 1985  MRN: [id number]  CSN: [id number]    Referring Practitioner James Tripathi MD [id number]      Diagnosis  Diagnoses       Z47.89 (ICD-10-CM) - Encounter for other orthopedic aftercare    M77.01 (ICD-10-CM) - Medial epicondylitis, right elbow           Treatment Diagnosis M25.521  Right Elbow Pain  R53.1 Weakness   Date of Evaluation 25   Additional Pertinent History Ray Panda has a past medical history of Cystic fibrosis (HCC) and Familial hypercholesterolemia.  he has a past surgical history that includes knee surgery (, ) and shoulder surgery (Left, 10-15-15).     Allergies No Known Allergies   Medications No current outpatient medications on file.      Functional Outcome Measure Used UEFI   Functional Outcome Score 51/80 (25)       Insurance: Primary: Payor: [employer] /  /  / ,   Secondary:    Authorization Information PRE CERTIFICATION REQUIRED: Additional authorization not required.  INSURANCE THERAPY BENEFIT: Visits approved based on medical necessity.. .   AQUATIC THERAPY COVERED: Yes  MODALITIES COVERED:  Yes   Initial CPT Codes Requested 97140-Manual Therapy, 97110-Therapeutic Exercise and 97035-Ultrasound   Progress Note Counter 4/10 for progress note (Reporting Period: 25 to     )   Recertification Date 25   Physician Follow-Up Sometime in February - pt. Not sure   Physician Orders OT evaluation for AROM, AAROM, PROM and strengthening   History of Present Illness Pt. Is 12 weeks post op from a right elbow flexor pronator debridement which occurred 10/21/24. Pt. Continues to feel that his right elbow is stiff and painful. Pt.

## 2025-02-05 ENCOUNTER — HOSPITAL ENCOUNTER (OUTPATIENT)
Dept: OCCUPATIONAL THERAPY | Age: 40
Setting detail: THERAPIES SERIES
Discharge: HOME | End: 2025-02-05
Payer: COMMERCIAL

## 2025-02-05 PROCEDURE — 97110 THERAPEUTIC EXERCISES: CPT

## 2025-02-05 PROCEDURE — 97035 APP MDLTY 1+ULTRASOUND EA 15: CPT

## 2025-02-05 PROCEDURE — 97140 MANUAL THERAPY 1/> REGIONS: CPT

## 2025-02-05 NOTE — PROGRESS NOTES
[facility]  OCCUPATIONAL THERAPY  [] EVALUATION  [x] DAILY NOTE (LAND) [] DAILY NOTE (AQUATIC ) [] PROGRESS NOTE [] DISCHARGE NOTE    [x] OUTPATIENT REHABILITATION CENTER [facility]   [] [place] [facility] [place]    [] [facility]   [] [facility]    Date: 2025  Patient Name:  Ray Panda  : 1985  MRN: [id number]  CSN: [id number]    Referring Practitioner James Tripathi MD [id number]      Diagnosis  Diagnoses       Z47.89 (ICD-10-CM) - Encounter for other orthopedic aftercare    M77.01 (ICD-10-CM) - Medial epicondylitis, right elbow           Treatment Diagnosis M25.521  Right Elbow Pain  R53.1 Weakness   Date of Evaluation 25   Additional Pertinent History Ray Panda has a past medical history of Cystic fibrosis (HCC) and Familial hypercholesterolemia.  he has a past surgical history that includes knee surgery (, ) and shoulder surgery (Left, 10-15-15).     Allergies No Known Allergies   Medications No current outpatient medications on file.      Functional Outcome Measure Used UEFI   Functional Outcome Score 51/80 (25)       Insurance: Primary: Payor: [employer] /  /  / ,   Secondary:    Authorization Information PRE CERTIFICATION REQUIRED: Additional authorization not required.  INSURANCE THERAPY BENEFIT: Visits approved based on medical necessity.. .   AQUATIC THERAPY COVERED: Yes  MODALITIES COVERED:  Yes   Initial CPT Codes Requested 97140-Manual Therapy, 97110-Therapeutic Exercise and 97035-Ultrasound   Progress Note Counter 5/10 for progress note (Reporting Period: 25 to     )   Recertification Date 25   Physician Follow-Up Dr. Tripathi 2025   Physician Orders OT evaluation for AROM, AAROM, PROM and strengthening   History of Present Illness Pt. Is 12 weeks post op from a right elbow flexor pronator debridement which occurred 10/21/24. Pt. Continues to feel that his right elbow is stiff and painful. Pt. States he

## 2025-02-07 ENCOUNTER — HOSPITAL ENCOUNTER (OUTPATIENT)
Dept: OCCUPATIONAL THERAPY | Age: 40
Setting detail: THERAPIES SERIES
End: 2025-02-07
Payer: COMMERCIAL

## 2025-02-12 ENCOUNTER — HOSPITAL ENCOUNTER (OUTPATIENT)
Dept: OCCUPATIONAL THERAPY | Age: 40
Setting detail: THERAPIES SERIES
Discharge: HOME | End: 2025-02-12
Payer: COMMERCIAL

## 2025-02-12 PROCEDURE — 97140 MANUAL THERAPY 1/> REGIONS: CPT

## 2025-02-12 PROCEDURE — 97035 APP MDLTY 1+ULTRASOUND EA 15: CPT

## 2025-02-12 PROCEDURE — 97110 THERAPEUTIC EXERCISES: CPT

## 2025-02-12 NOTE — PROGRESS NOTES
[facility]  OCCUPATIONAL THERAPY  [] EVALUATION  [x] DAILY NOTE (LAND) [] DAILY NOTE (AQUATIC ) [] PROGRESS NOTE [] DISCHARGE NOTE    [x] OUTPATIENT REHABILITATION CENTER [facility]   [] [place] [facility]ARE [place]    [] [facility]   [] [facility]    Date: 2025  Patient Name:  Ray Panda  : 1985  MRN: [id number]  CSN: [id number]    Referring Practitioner James Tripathi MD [id number]      Diagnosis  Diagnoses       Z47.89 (ICD-10-CM) - Encounter for other orthopedic aftercare    M77.01 (ICD-10-CM) - Medial epicondylitis, right elbow           Treatment Diagnosis M25.521  Right Elbow Pain  R53.1 Weakness   Date of Evaluation 25   Additional Pertinent History Ray Panda has a past medical history of Cystic fibrosis (HCC) and Familial hypercholesterolemia.  he has a past surgical history that includes knee surgery (, ) and shoulder surgery (Left, 10-15-15).     Allergies No Known Allergies   Medications No current outpatient medications on file.      Functional Outcome Measure Used UEFI   Functional Outcome Score 51/80 (25)       Insurance: Primary: Payor: [employer] /  /  / ,   Secondary:    Authorization Information PRE CERTIFICATION REQUIRED: Additional authorization not required.  INSURANCE THERAPY BENEFIT: Visits approved based on medical necessity.. .   AQUATIC THERAPY COVERED: Yes  MODALITIES COVERED:  Yes   Initial CPT Codes Requested 97140-Manual Therapy, 97110-Therapeutic Exercise and 97035-Ultrasound   Progress Note Counter 5/10 for progress note (Reporting Period: 25 to     )   Recertification Date 25   Physician Follow-Up Dr. Tripathi 2025   Physician Orders OT evaluation for AROM, AAROM, PROM and strengthening   History of Present Illness Pt. Is 12 weeks post op from a right elbow flexor pronator debridement which occurred 10/21/24. Pt. Continues to feel that his right elbow is stiff and painful. Pt. States he

## 2025-02-14 ENCOUNTER — HOSPITAL ENCOUNTER (OUTPATIENT)
Dept: OCCUPATIONAL THERAPY | Age: 40
Setting detail: THERAPIES SERIES
End: 2025-02-14
Payer: COMMERCIAL